# Patient Record
Sex: MALE | URBAN - METROPOLITAN AREA
[De-identification: names, ages, dates, MRNs, and addresses within clinical notes are randomized per-mention and may not be internally consistent; named-entity substitution may affect disease eponyms.]

---

## 2019-12-24 ENCOUNTER — TRANSFERRED RECORDS (OUTPATIENT)
Dept: HEALTH INFORMATION MANAGEMENT | Facility: CLINIC | Age: 74
End: 2019-12-24

## 2023-03-11 ENCOUNTER — TRANSFERRED RECORDS (OUTPATIENT)
Dept: HEALTH INFORMATION MANAGEMENT | Facility: CLINIC | Age: 78
End: 2023-03-11

## 2023-09-21 ENCOUNTER — TRANSFERRED RECORDS (OUTPATIENT)
Dept: HEALTH INFORMATION MANAGEMENT | Facility: CLINIC | Age: 78
End: 2023-09-21

## 2023-09-25 ENCOUNTER — TRANSCRIBE ORDERS (OUTPATIENT)
Dept: OTHER | Age: 78
End: 2023-09-25

## 2023-09-25 DIAGNOSIS — I86.1 VARICOCELE: ICD-10-CM

## 2023-09-25 DIAGNOSIS — N50.811 RIGHT TESTICULAR PAIN: Primary | ICD-10-CM

## 2023-10-04 ENCOUNTER — APPOINTMENT (OUTPATIENT)
Dept: INTERPRETER SERVICES | Facility: CLINIC | Age: 78
End: 2023-10-04
Payer: COMMERCIAL

## 2023-10-04 ENCOUNTER — TELEPHONE (OUTPATIENT)
Dept: UROLOGY | Facility: CLINIC | Age: 78
End: 2023-10-04
Payer: COMMERCIAL

## 2023-10-04 NOTE — TELEPHONE ENCOUNTER
Called patient to assess his pain with Román , the patient had this pain for 6 months. He did see someone in [place] where he did ultrasound and blood tests. Patient will bring all records with him to the appointment.  Ended the call due to patient saying this writer was disturbing him by giving him an appointment in December and asking him questions wasting his time. Told patient that was the point the call to assess his symptoms. Patient was told the call would end if he felt that way. Call ended.    Diann Cabral, RN, BSN  Care Coordinator Urology  [facility]  Urology Clinic  [phone]

## 2023-11-03 ENCOUNTER — TRANSFERRED RECORDS (OUTPATIENT)
Dept: HEALTH INFORMATION MANAGEMENT | Facility: CLINIC | Age: 78
End: 2023-11-03
Payer: COMMERCIAL

## 2023-11-03 NOTE — TELEPHONE ENCOUNTER
Action November 3, 2023 JTV 10:30 AM    Action Taken CSS sent an urgent request to [facility] for records.      Action November 10, 2023 jtv 8:22 AM    Action Taken CSS sent a 2nd urgent request to [facility] for records.      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 Pilo Weldon @ [facility] contact number:   [phone] Fax [phone]     RECORDS REQUESTED FOR VISIT                                                     NOTES  STATUS/DETAILS   OFFICE NOTE from referring provider Media tab YES, 2023 -- MANUELA WELDNO @ [facility]   OFFICE NOTE from other specialist  in process, patient will bring records to appointment per note with Nurse on 10/04/2023.   MEDICATION LIST  YES   LABS     FHS, LH, TESTOSTERONE, PROLACTIN  YES   images REPORT ONLY YES, 2023 -- US SCROTUM @ [facility]     PRE-VISIT CHECKLIST      Joint diagnostic appointment coordinated correctly          (ensure right order & amount of time) Yes   RECORD COLLECTION COMPLETE YES

## 2023-11-21 ENCOUNTER — PRE VISIT (OUTPATIENT)
Dept: UROLOGY | Facility: CLINIC | Age: 78
End: 2023-11-21
Payer: COMMERCIAL

## 2023-11-21 NOTE — CONFIDENTIAL NOTE
Reason for visit: consult      Relevant information: chronic testicular pain    Records/imaging/labs/orders: pending    At Rooming: morena Kumar  11/21/2023  11:00 AM

## 2023-12-01 ENCOUNTER — PRE VISIT (OUTPATIENT)
Dept: UROLOGY | Facility: CLINIC | Age: 78
End: 2023-12-01